# Patient Record
Sex: FEMALE | Race: BLACK OR AFRICAN AMERICAN | NOT HISPANIC OR LATINO | URBAN - METROPOLITAN AREA
[De-identification: names, ages, dates, MRNs, and addresses within clinical notes are randomized per-mention and may not be internally consistent; named-entity substitution may affect disease eponyms.]

---

## 2020-10-06 ENCOUNTER — EMERGENCY (EMERGENCY)
Facility: HOSPITAL | Age: 35
LOS: 0 days | Discharge: HOME | End: 2020-10-06
Payer: COMMERCIAL

## 2020-10-06 VITALS
DIASTOLIC BLOOD PRESSURE: 73 MMHG | WEIGHT: 136.69 LBS | HEART RATE: 81 BPM | TEMPERATURE: 97 F | RESPIRATION RATE: 15 BRPM | OXYGEN SATURATION: 100 % | SYSTOLIC BLOOD PRESSURE: 133 MMHG

## 2020-10-06 DIAGNOSIS — N64.52 NIPPLE DISCHARGE: ICD-10-CM

## 2020-10-06 DIAGNOSIS — N61.0 MASTITIS WITHOUT ABSCESS: ICD-10-CM

## 2020-10-06 DIAGNOSIS — N64.4 MASTODYNIA: ICD-10-CM

## 2020-10-06 PROCEDURE — 99283 EMERGENCY DEPT VISIT LOW MDM: CPT

## 2020-10-06 NOTE — ED PROVIDER NOTE - PATIENT PORTAL LINK FT
You can access the FollowMyHealth Patient Portal offered by [facility] by registering at the following website: [email or web address]. By joining [email or web address]’s FollowMyHealth portal, you will also be able to view your health information using other applications (apps) compatible with our system.

## 2020-10-06 NOTE — ED ADULT NURSE NOTE - OBJECTIVE STATEMENT
Pt presents to ED c/o left sided lower tooth pain that radiates to her wisdom tooth x 2 days. In addition, pt endorses left sided breast pain with palpation, LMP 9/17. Pt presents to ED c/o left sided lower tooth pain that radiates to her wisdom tooth x 2 days. Pt took 3 OTC motrin and then an hour later took an additional 2, yesterday. In addition, pt endorses left sided breast pain with palpation, LMP 9/17. Denies trauma. Pt states breast pain is more toward the midline at the areola, at around 10 o'clock. Pt states there was clear discharge as recently as this morning, but none is present now. Pt also states it itches. No redness noted. No pus expressed. Pt educated on how to perform self-breast exams and educated on safe OTC medication use. will continue to monitor awaiting orders

## 2020-10-06 NOTE — ED PROVIDER NOTE - CLINICAL SUMMARY MEDICAL DECISION MAKING FREE TEXT BOX
clindamycin, motrin and f/u w pmd in 1 week or gyn in 2 weeks for ultrasound   Discussed results and outcome of testing with the patient.  Patient advised to please follow up with their primary care doctor within the next 24-48 hours and return to the Emergency Department for worsening symptoms or any other concerns.  Patient advised that their doctor may call  to follow up on the specific results of the tests performed today in the emergency department.

## 2020-10-06 NOTE — ED PROVIDER NOTE - OBJECTIVE STATEMENT
this is a 34 f C/O LEFT breast  pain, discharge x 2 days. Denies nausea, vomiting, fever, sob, chest pain. Pt is not pregnant

## 2020-10-06 NOTE — ED ADULT TRIAGE NOTE - CHIEF COMPLAINT QUOTE
c/o clear drainage from left nipple, reports site is tender. Denies breastfeeding, piercing, or trauma to affected site. ,
